# Patient Record
Sex: MALE | Race: WHITE | NOT HISPANIC OR LATINO
[De-identification: names, ages, dates, MRNs, and addresses within clinical notes are randomized per-mention and may not be internally consistent; named-entity substitution may affect disease eponyms.]

---

## 2018-08-20 PROBLEM — Z00.00 ENCOUNTER FOR PREVENTIVE HEALTH EXAMINATION: Status: ACTIVE | Noted: 2018-08-20

## 2018-09-13 ENCOUNTER — RECORD ABSTRACTING (OUTPATIENT)
Age: 63
End: 2018-09-13

## 2018-09-13 DIAGNOSIS — Z78.9 OTHER SPECIFIED HEALTH STATUS: ICD-10-CM

## 2018-09-13 DIAGNOSIS — Z87.891 PERSONAL HISTORY OF NICOTINE DEPENDENCE: ICD-10-CM

## 2018-09-13 DIAGNOSIS — Z83.3 FAMILY HISTORY OF DIABETES MELLITUS: ICD-10-CM

## 2018-09-13 LAB
HBA1C MFR BLD HPLC: 7.7
HBA1C MFR BLD: 7.7
HBA1C MFR BLD: 7.7
PODIATRY EVAL: NORMAL

## 2018-09-13 RX ORDER — ROSUVASTATIN CALCIUM 10 MG/1
10 TABLET, FILM COATED ORAL DAILY
Refills: 0 | Status: ACTIVE | COMMUNITY

## 2018-09-19 ENCOUNTER — APPOINTMENT (OUTPATIENT)
Dept: ENDOCRINOLOGY | Facility: CLINIC | Age: 63
End: 2018-09-19
Payer: COMMERCIAL

## 2018-09-19 VITALS
HEIGHT: 67 IN | HEART RATE: 82 BPM | WEIGHT: 213 LBS | DIASTOLIC BLOOD PRESSURE: 90 MMHG | SYSTOLIC BLOOD PRESSURE: 140 MMHG | BODY MASS INDEX: 33.43 KG/M2

## 2018-09-19 LAB — GLUCOSE BLDC GLUCOMTR-MCNC: 141

## 2018-09-19 PROCEDURE — 82962 GLUCOSE BLOOD TEST: CPT

## 2018-09-19 PROCEDURE — 99215 OFFICE O/P EST HI 40 MIN: CPT | Mod: 25

## 2018-09-19 RX ORDER — ENALAPRIL MALEATE 5 MG/1
5 TABLET ORAL DAILY
Refills: 0 | Status: DISCONTINUED | COMMUNITY
End: 2018-09-19

## 2018-09-20 ENCOUNTER — APPOINTMENT (OUTPATIENT)
Dept: ENDOCRINOLOGY | Facility: CLINIC | Age: 63
End: 2018-09-20
Payer: COMMERCIAL

## 2018-09-20 PROCEDURE — 76536 US EXAM OF HEAD AND NECK: CPT

## 2018-09-28 ENCOUNTER — RESULT REVIEW (OUTPATIENT)
Age: 63
End: 2018-09-28

## 2018-12-07 ENCOUNTER — MEDICATION RENEWAL (OUTPATIENT)
Age: 63
End: 2018-12-07

## 2019-01-04 ENCOUNTER — MEDICATION RENEWAL (OUTPATIENT)
Age: 64
End: 2019-01-04

## 2019-01-15 ENCOUNTER — RECORD ABSTRACTING (OUTPATIENT)
Age: 64
End: 2019-01-15

## 2019-01-15 DIAGNOSIS — Z86.39 PERSONAL HISTORY OF OTHER ENDOCRINE, NUTRITIONAL AND METABOLIC DISEASE: ICD-10-CM

## 2019-01-23 ENCOUNTER — APPOINTMENT (OUTPATIENT)
Dept: ENDOCRINOLOGY | Facility: CLINIC | Age: 64
End: 2019-01-23
Payer: MEDICARE

## 2019-01-23 VITALS
HEART RATE: 90 BPM | DIASTOLIC BLOOD PRESSURE: 90 MMHG | SYSTOLIC BLOOD PRESSURE: 142 MMHG | HEIGHT: 67 IN | WEIGHT: 215 LBS | BODY MASS INDEX: 33.74 KG/M2

## 2019-01-23 DIAGNOSIS — H26.9 UNSPECIFIED CATARACT: ICD-10-CM

## 2019-01-23 LAB
GLUCOSE BLDC GLUCOMTR-MCNC: 114
GLUCOSE SERPL-MCNC: 193
HBA1C MFR BLD HPLC: 8.2
LDLC SERPL DIRECT ASSAY-MCNC: 88
MICROALBUMIN/CREAT 24H UR-RTO: 4

## 2019-01-23 PROCEDURE — 99214 OFFICE O/P EST MOD 30 MIN: CPT | Mod: 25

## 2019-01-23 PROCEDURE — 95251 CONT GLUC MNTR ANALYSIS I&R: CPT

## 2019-01-23 RX ORDER — FLASH GLUCOSE SENSOR
KIT MISCELLANEOUS
Qty: 0 | Refills: 0 | Status: COMPLETED | OUTPATIENT
Start: 2019-01-23

## 2019-01-23 RX ADMIN — Medication 0: at 00:00

## 2019-03-04 ENCOUNTER — MEDICATION RENEWAL (OUTPATIENT)
Age: 64
End: 2019-03-04

## 2019-03-04 RX ORDER — INSULIN ASPART 100 [IU]/ML
100 INJECTION, SOLUTION INTRAVENOUS; SUBCUTANEOUS
Qty: 2 | Refills: 0 | Status: DISCONTINUED | COMMUNITY
Start: 1900-01-01 | End: 2019-03-04

## 2019-05-07 ENCOUNTER — APPOINTMENT (OUTPATIENT)
Dept: ENDOCRINOLOGY | Facility: CLINIC | Age: 64
End: 2019-05-07
Payer: MEDICARE

## 2019-05-07 VITALS
HEIGHT: 67 IN | WEIGHT: 210 LBS | HEART RATE: 90 BPM | OXYGEN SATURATION: 95 % | DIASTOLIC BLOOD PRESSURE: 80 MMHG | SYSTOLIC BLOOD PRESSURE: 140 MMHG | BODY MASS INDEX: 32.96 KG/M2

## 2019-05-07 DIAGNOSIS — Z79.899 OTHER LONG TERM (CURRENT) DRUG THERAPY: ICD-10-CM

## 2019-05-07 LAB — GLUCOSE BLDC GLUCOMTR-MCNC: 265

## 2019-05-07 PROCEDURE — 99214 OFFICE O/P EST MOD 30 MIN: CPT | Mod: 25

## 2019-05-07 PROCEDURE — 82962 GLUCOSE BLOOD TEST: CPT

## 2019-06-17 ENCOUNTER — RX RENEWAL (OUTPATIENT)
Age: 64
End: 2019-06-17

## 2019-07-29 ENCOUNTER — MEDICATION RENEWAL (OUTPATIENT)
Age: 64
End: 2019-07-29

## 2019-08-05 ENCOUNTER — APPOINTMENT (OUTPATIENT)
Dept: ENDOCRINOLOGY | Facility: CLINIC | Age: 64
End: 2019-08-05
Payer: MEDICARE

## 2019-08-05 VITALS
SYSTOLIC BLOOD PRESSURE: 134 MMHG | DIASTOLIC BLOOD PRESSURE: 80 MMHG | WEIGHT: 213 LBS | HEIGHT: 67 IN | BODY MASS INDEX: 33.43 KG/M2 | HEART RATE: 84 BPM

## 2019-08-05 DIAGNOSIS — E11.319 TYPE 2 DIABETES MELLITUS WITH UNSPECIFIED DIABETIC RETINOPATHY W/OUT MACULAR EDEMA: ICD-10-CM

## 2019-08-05 LAB
GLUCOSE BLDC GLUCOMTR-MCNC: 251
GLUCOSE SERPL-MCNC: 226
HBA1C MFR BLD HPLC: 10.1
LDLC SERPL DIRECT ASSAY-MCNC: 87
MICROALBUMIN/CREAT 24H UR-RTO: 4

## 2019-08-05 PROCEDURE — 82962 GLUCOSE BLOOD TEST: CPT

## 2019-08-05 PROCEDURE — 99214 OFFICE O/P EST MOD 30 MIN: CPT | Mod: 25

## 2019-08-29 ENCOUNTER — RX RENEWAL (OUTPATIENT)
Age: 64
End: 2019-08-29

## 2019-09-12 ENCOUNTER — RX RENEWAL (OUTPATIENT)
Age: 64
End: 2019-09-12

## 2019-10-18 ENCOUNTER — MEDICATION RENEWAL (OUTPATIENT)
Age: 64
End: 2019-10-18

## 2019-11-11 ENCOUNTER — MEDICATION RENEWAL (OUTPATIENT)
Age: 64
End: 2019-11-11

## 2019-11-25 LAB
HBA1C MFR BLD HPLC: 8.6
LDLC SERPL DIRECT ASSAY-MCNC: 74

## 2019-11-26 ENCOUNTER — APPOINTMENT (OUTPATIENT)
Dept: ENDOCRINOLOGY | Facility: CLINIC | Age: 64
End: 2019-11-26
Payer: MEDICARE

## 2019-11-26 VITALS
DIASTOLIC BLOOD PRESSURE: 86 MMHG | WEIGHT: 211 LBS | BODY MASS INDEX: 33.12 KG/M2 | HEIGHT: 67 IN | SYSTOLIC BLOOD PRESSURE: 144 MMHG | HEART RATE: 96 BPM

## 2019-11-26 LAB — GLUCOSE BLDC GLUCOMTR-MCNC: 163

## 2019-11-26 PROCEDURE — 82962 GLUCOSE BLOOD TEST: CPT

## 2019-11-26 PROCEDURE — 99214 OFFICE O/P EST MOD 30 MIN: CPT | Mod: 25

## 2019-11-26 RX ORDER — GLIPIZIDE 10 MG/1
10 TABLET, FILM COATED, EXTENDED RELEASE ORAL DAILY
Qty: 90 | Refills: 1 | Status: DISCONTINUED | COMMUNITY
Start: 2019-09-12 | End: 2019-11-26

## 2019-11-26 NOTE — REVIEW OF SYSTEMS
[Polydipsia] : polydipsia [Fatigue] : no fatigue [Recent Weight Gain (___ Lbs)] : no recent weight gain [Recent Weight Loss (___ Lbs)] : no recent weight loss [Visual Field Defect] : no visual field defect [Blurry Vision] : no blurred vision [Chest Pain] : no chest pain [Palpitations] : no palpitations [SOB on Exertion] : no shortness of breath during exertion [Shortness Of Breath] : no shortness of breath [Nausea] : no nausea [Vomiting] : no vomiting was observed [Abdominal Pain] : no abdominal pain [Polyuria] : no polyuria [Depression] : no depression [Nocturia] : no nocturia [Heat Intolerance] : heat tolerant [Cold Intolerance] : cold tolerant [Anxiety] : no anxiety [FreeTextEntry3] : s/p cataract surgery

## 2019-11-26 NOTE — HISTORY OF PRESENT ILLNESS
[FreeTextEntry1] : no recent labs, has appt tomorrow for blood draw\par \par Quality: Type 2\par Severity: severe, uncontrolled, chronically poor control\par Duration: 2011\par Onset: thirst, increased urination\par \par ASSOCIATED SYMPTOMS/COMPLICATIONS:\par 7/2016 eye exam (+) retinopathy s/p laser tx, eye exam 2018 okay per pt. eye exam 5/29/19 - noDR\par (-)neuropathy\par no h/o CAD\par neg urine microalb/Cr\par \par MODIFYING FACTORS: \par Diet: nonadherent, eats out a lot. eat BF and DInner, snack in between - fruit\par Exercise: treadmill on most days\par \par Current DM meds:\par Lantus 20 units at bedtime\par Humalog 12 units after breakfast and dinner, does not eat lunch\par Glipizide ER 10 mg daily\par Januvia 100 mg daily\par Glucophage 1000 mg BID\par \par SMBG:  glucoses okay per pt FS 's, testing 2x daily, 1x 's after very strenrous activity\par meter reviewed\par 11/15 226\par 11/16 260\par 11/17 ---------- 205 - 259\par 11/18 158 - 119 - 54\par 11/20 --------- 265 - 179\par 11/21 162  ----- \par 11/22 ------------- 76\par 11/23 -------------------- 240\par 11/24 187 --------- 161\par 11/25  ------------------- 72\par \par \par

## 2019-11-26 NOTE — PHYSICAL EXAM
[Alert] : alert [No Acute Distress] : no acute distress [Well Nourished] : well nourished [PERRL] : pupils equal, round and reactive to light [Well Developed] : well developed [No LAD] : no lymphadenopathy [EOMI] : extra ocular movement intact [Normal Rate and Effort] : normal respiratory rhythm and effort [Clear to Auscultation] : lungs were clear to auscultation bilaterally [Normal Rate] : heart rate was normal  [Normal S1, S2] : normal S1 and S2 [Normal Bowel Sounds] : normal bowel sounds [No Edema] : there was no peripheral edema [Not Tender] : non-tender [Soft] : abdomen soft [Not Distended] : not distended [Cranial Nerves Intact] : cranial nerves 2-12 were intact [Oriented x3] : oriented to person, place, and time [Normal Insight/Judgement] : insight and judgment were intact [Normal Affect] : the affect was normal [Normal Mood] : the mood was normal [Foot Ulcers] : no foot ulcers [Right Foot Was Examined] : right foot ~C was examined [Left Foot Was Examined] : left foot ~C was examined [Position Sense Dec.] : normal position sense at the level of the toes [Vibration Dec.] : normal vibratory sensation at the level of the toes [2+] : 2+ in the dorsalis pedis [de-identified] : obese appearance [Diminished Throughout Both Feet] : normal tactile sensation with monofilament testing throughout both feet

## 2019-11-26 NOTE — DATA REVIEWED
[FreeTextEntry1] : Yudelka thakkar in office 9/20/18\par The right thyroid lobe measures 4.0x1.7x1.7 cm. The left thyroid lobe measures 3.8x1.5x1.2 cm. The isthmus measures.5 cm. \par The right thyroid lobe has a homogenous parenchyma. \par The left thyroid lobe has a homogeneous parenchyma . \par There are no distinct nodules visualized. \par \par Labs 10/6/18\par Cr 0.93, GFR 86\par urine microalb/Cr 4\par LDl chol 88, Tg 176\par TSh 4.21\par A1c 8.2%\par \par Labs 1/29/19\par Gluc 180, A1c 8%\par Cr 0.86, GFR 91\par LDl 80, HDL 44, Tg 170\par \par Labs 5/16/19\par Gluc 226, A1c 10.1\par Cr 0.89, GFR 90\par urine microalb/Cr 4\par LDL 87, HDL 38, Tg 254\par TSH 3.87\par B12 266\par \par no recent labs

## 2019-11-26 NOTE — ASSESSMENT
[FreeTextEntry1] : 1. T2DM -  suboptimal control - fasting hyperglycemia, evenign hypoglycemia\par - increase Lantus 24 units, decreased Glipizide ER 5 mg daily\par - cont prandial Humalog, MFN and Januvia\par - needs to test more, send logs in 1-2 weeks\par \par 2. HLD - controlled, cont statin\par 3. HTN - BP elevated, cont current BP meds, PCP f/u, counseled pt on low salt diet\par \par Updated labs need, pt to go for blood  test tomorrow\par

## 2019-11-26 NOTE — REASON FOR VISIT
[Follow-Up: _____] : a [unfilled] follow-up visit [FreeTextEntry1] : worsening T2DM, stable hyperlipidemia, worsening HTN

## 2020-01-28 ENCOUNTER — RX RENEWAL (OUTPATIENT)
Age: 65
End: 2020-01-28

## 2020-02-20 ENCOUNTER — RX RENEWAL (OUTPATIENT)
Age: 65
End: 2020-02-20

## 2020-04-08 ENCOUNTER — APPOINTMENT (OUTPATIENT)
Dept: ENDOCRINOLOGY | Facility: CLINIC | Age: 65
End: 2020-04-08
Payer: MEDICARE

## 2020-04-08 PROCEDURE — 99442: CPT

## 2020-05-12 ENCOUNTER — RX RENEWAL (OUTPATIENT)
Age: 65
End: 2020-05-12

## 2020-05-21 ENCOUNTER — RX CHANGE (OUTPATIENT)
Age: 65
End: 2020-05-21

## 2020-05-22 ENCOUNTER — RX RENEWAL (OUTPATIENT)
Age: 65
End: 2020-05-22

## 2020-05-22 RX ORDER — BLOOD SUGAR DIAGNOSTIC
STRIP MISCELLANEOUS 4 TIMES DAILY
Qty: 4 | Refills: 1 | Status: DISCONTINUED | COMMUNITY
Start: 2020-05-21 | End: 2020-05-22

## 2020-06-22 ENCOUNTER — RX RENEWAL (OUTPATIENT)
Age: 65
End: 2020-06-22

## 2020-07-24 ENCOUNTER — RX RENEWAL (OUTPATIENT)
Age: 65
End: 2020-07-24

## 2020-07-27 LAB
HBA1C MFR BLD HPLC: 8.8
LDLC SERPL DIRECT ASSAY-MCNC: 74

## 2020-07-28 ENCOUNTER — APPOINTMENT (OUTPATIENT)
Dept: ENDOCRINOLOGY | Facility: CLINIC | Age: 65
End: 2020-07-28
Payer: MEDICARE

## 2020-07-28 VITALS
HEART RATE: 90 BPM | DIASTOLIC BLOOD PRESSURE: 88 MMHG | WEIGHT: 220 LBS | SYSTOLIC BLOOD PRESSURE: 160 MMHG | HEIGHT: 67 IN | BODY MASS INDEX: 34.53 KG/M2

## 2020-07-28 DIAGNOSIS — E11.649 TYPE 2 DIABETES MELLITUS WITH HYPOGLYCEMIA W/OUT COMA: ICD-10-CM

## 2020-07-28 LAB — GLUCOSE BLDC GLUCOMTR-MCNC: 124

## 2020-07-28 PROCEDURE — 82962 GLUCOSE BLOOD TEST: CPT

## 2020-07-28 PROCEDURE — 99214 OFFICE O/P EST MOD 30 MIN: CPT | Mod: 25

## 2020-07-28 NOTE — REVIEW OF SYSTEMS
[Recent Weight Gain (___ Lbs)] : recent weight gain: [unfilled] lbs [Constipation] : constipation [Blurred Vision] : no blurred vision [Chest Pain] : no chest pain [Palpitations] : no palpitations [Shortness Of Breath] : no shortness of breath [SOB on Exertion] : no shortness of breath on exertion [Nausea] : no nausea [Abdominal Pain] : no abdominal pain [Polyuria] : no polyuria [Nocturia] : no nocturia [Pain/Numbness of Digits] : no pain/numbness of digits [Depression] : no depression [Anxiety] : no anxiety [Polydipsia] : no polydipsia

## 2020-07-28 NOTE — PHYSICAL EXAM
[Alert] : alert [Well Nourished] : well nourished [Healthy Appearance] : healthy appearance [No Acute Distress] : no acute distress [EOMI] : extra ocular movement intact [No LAD] : no lymphadenopathy [Thyroid Not Enlarged] : the thyroid was not enlarged [No Thyroid Nodules] : no palpable thyroid nodules [No Respiratory Distress] : no respiratory distress [No Accessory Muscle Use] : no accessory muscle use [Clear to Auscultation] : lungs were clear to auscultation bilaterally [Normal S1, S2] : normal S1 and S2 [Normal Rate] : heart rate was normal [No Edema] : no peripheral edema [Normal Bowel Sounds] : normal bowel sounds [Soft] : abdomen soft [Normal Gait] : normal gait [Acanthosis Nigricans] : no acanthosis nigricans [Cranial Nerves Intact] : cranial nerves 2-12 were intact [Oriented x3] : oriented to person, place, and time [Normal Affect] : the affect was normal [Normal Insight/Judgement] : insight and judgment were intact [Normal Mood] : the mood was normal

## 2020-07-28 NOTE — DATA REVIEWED
[FreeTextEntry1] : Yudelka thakkar in office 9/20/18\par The right thyroid lobe measures 4.0x1.7x1.7 cm. The left thyroid lobe measures 3.8x1.5x1.2 cm. The isthmus measures.5 cm. \par The right thyroid lobe has a homogenous parenchyma. \par The left thyroid lobe has a homogeneous parenchyma . \par There are no distinct nodules visualized. \par \par Labs 10/6/18\par Cr 0.93, GFR 86\par urine microalb/Cr 4\par LDl chol 88, Tg 176\par TSh 4.21\par A1c 8.2%\par \par Labs 1/29/19\par Gluc 180, A1c 8%\par Cr 0.86, GFR 91\par LDl 80, HDL 44, Tg 170\par \par Labs 5/16/19\par Gluc 226, A1c 10.1\par Cr 0.89, GFR 90\par urine microalb/Cr 4\par LDL 87, HDL 38, Tg 254\par TSH 3.87\par B12 266\par \par Labs 6/26/20\par Gluc 217, A1c 8.8\par Cr 0.95, GFR 83\par LDL 74, HDL 41, Tg 193

## 2020-07-28 NOTE — HISTORY OF PRESENT ILLNESS
[FreeTextEntry1] : Interval Hx - c/o weight gain\par \par Quality: Type 2\par Severity: severe, uncontrolled, chronically poor control\par Duration: 2011\par Onset: thirst, increased urination\par \par ASSOCIATED SYMPTOMS/COMPLICATIONS:\par 7/2016 eye exam (+) retinopathy s/p laser tx, eye exam 2018 okay per pt. eye exam 5/29/19 - noDR\par (-)neuropathy\par no h/o CAD\par neg urine microalb/Cr\par \par MODIFYING FACTORS: \par Diet:  eat BF and Dinner.  BF eggs w 2 slices, dinner stew\par Exercise: 30 min brisk walk on treadmill\par \par Current DM meds:\par Lantus 30 units at bedtime\par Humalog 14 units after breakfast and dinner, does not eat lunch.. snacks on fruits\par Glipizide ER 5 mg daily\par Januvia 100 mg daily\par Glucophage 1000 mg BID\par \par SMBG:  no meters. does not record keep. testing 3x daily. denies lows\par am: \par 1-2 pm: 148\par before dinner 's\par \par \par \par

## 2020-07-28 NOTE — ASSESSMENT
[FreeTextEntry1] : 1. T2DM - suboptimal control, no logs w pt\par - decrease fruit intake or have fruits as part of carbs for meals, try and eat less carbs, more veggies and protein\par - cont Lantus 30 units,cont current oral DM meds, cont prandial insulin\par - keep diet log, test FS 1-2 hours post meals and snacks and send logs\par - repeat A1c 3 months\par - eye exam w ophthalmology\par \par 2. HLD - cont statin\par \par 3. HTN -elevated,watch dietary salt, cont current BP meds, advised PCP f/u for BP management

## 2020-08-18 ENCOUNTER — RX RENEWAL (OUTPATIENT)
Age: 65
End: 2020-08-18

## 2020-10-06 ENCOUNTER — RX RENEWAL (OUTPATIENT)
Age: 65
End: 2020-10-06

## 2020-10-20 ENCOUNTER — APPOINTMENT (OUTPATIENT)
Dept: ENDOCRINOLOGY | Facility: CLINIC | Age: 65
End: 2020-10-20
Payer: MEDICARE

## 2020-10-20 VITALS
OXYGEN SATURATION: 98 % | HEIGHT: 67 IN | HEART RATE: 88 BPM | DIASTOLIC BLOOD PRESSURE: 88 MMHG | BODY MASS INDEX: 33.74 KG/M2 | SYSTOLIC BLOOD PRESSURE: 142 MMHG | WEIGHT: 215 LBS

## 2020-10-20 LAB — GLUCOSE BLDC GLUCOMTR-MCNC: 125

## 2020-10-20 PROCEDURE — 99214 OFFICE O/P EST MOD 30 MIN: CPT | Mod: 25

## 2020-10-20 PROCEDURE — 95250 CONT GLUC MNTR PHYS/QHP EQP: CPT

## 2020-10-20 PROCEDURE — 82962 GLUCOSE BLOOD TEST: CPT

## 2020-10-20 RX ORDER — ENALAPRIL MALEATE 20 MG/1
20 TABLET ORAL
Refills: 0 | Status: ACTIVE | COMMUNITY

## 2020-10-20 RX ORDER — ENALAPRIL MALEATE 10 MG/1
10 TABLET ORAL DAILY
Qty: 90 | Refills: 0 | Status: DISCONTINUED | COMMUNITY
End: 2020-10-20

## 2020-10-20 RX ORDER — ASPIRIN 81 MG
81 TABLET,CHEWABLE ORAL
Refills: 0 | Status: ACTIVE | COMMUNITY

## 2020-10-20 RX ADMIN — Medication 0: at 00:00

## 2020-10-20 NOTE — ASSESSMENT
[FreeTextEntry1] : 1. T2DM - suboptimal control, no logs w pt, no recent labs\par - Gen Pro today to assess for patterns of highs/lows\par - cont Lantus 30 units,cont current oral DM meds, cont prandial insulin\par - keep diet log, test FS 1-2 hours post meals and snacks and send logs\par - repeat A1c needed\par -  yearly eye exam w ophthalmology\par \par 2. HLD - cont statin\par \par 3. HTN - improved cont current BP meds, f/u cardio

## 2020-10-20 NOTE — PHYSICAL EXAM
[Alert] : alert [Well Nourished] : well nourished [EOMI] : extra ocular movement intact [Healthy Appearance] : healthy appearance [No Acute Distress] : no acute distress [No LAD] : no lymphadenopathy [Thyroid Not Enlarged] : the thyroid was not enlarged [No Thyroid Nodules] : no palpable thyroid nodules [No Respiratory Distress] : no respiratory distress [No Accessory Muscle Use] : no accessory muscle use [Clear to Auscultation] : lungs were clear to auscultation bilaterally [Normal Rate] : heart rate was normal [Normal S1, S2] : normal S1 and S2 [No Edema] : no peripheral edema [Normal Bowel Sounds] : normal bowel sounds [Soft] : abdomen soft [Normal Gait] : normal gait [Oriented x3] : oriented to person, place, and time [Cranial Nerves Intact] : cranial nerves 2-12 were intact [Normal Mood] : the mood was normal [Normal Insight/Judgement] : insight and judgment were intact [Normal Affect] : the affect was normal [Acanthosis Nigricans] : no acanthosis nigricans

## 2020-10-20 NOTE — HISTORY OF PRESENT ILLNESS
[FreeTextEntry1] : Interval Hx - 8/2020 went to [facility] for chest pain, saw cardio and BP meds changed\par \par Quality: Type 2\par Severity: severe, uncontrolled, chronically poor control\par Duration: 2011\par Onset: thirst, increased urination\par \par ASSOCIATED SYMPTOMS/COMPLICATIONS:\par 8/2020 eye exam (+) retinopathy s/p laser tx, eye exam 2018 okay per pt.\par (-)neuropathy\par \par MODIFYING FACTORS: \par Diet:  trying to watch diet. eats 2 meals w snacks on fruits/nuts\par Exercise: 30 min brisk walk on treadmill\par Weight stable. \par \par Current DM meds:\par Lantus 30 units at bedtime\par Humalog 14 units after breakfast and dinner, does not eat lunch. 4-5  units snacks on fruits\par Glipizide ER 5 mg daily\par Januvia 100 mg daily\par Glucophage 1000 mg BID\par \par SMBG:  no meter. does not record keep. testing 3x daily. FS 's\par \par \par \par \par

## 2020-10-20 NOTE — REVIEW OF SYSTEMS
[Recent Weight Gain (___ Lbs)] : recent weight gain: [unfilled] lbs [Blurred Vision] : no blurred vision [Chest Pain] : no chest pain [Palpitations] : no palpitations [SOB on Exertion] : no shortness of breath on exertion [Shortness Of Breath] : no shortness of breath [Abdominal Pain] : no abdominal pain [Nausea] : no nausea [Polyuria] : no polyuria [Nocturia] : no nocturia [Acanthosis] : no acanthosis  [Pain/Numbness of Digits] : no pain/numbness of digits [Depression] : no depression [Anxiety] : no anxiety [Polydipsia] : no polydipsia

## 2020-10-29 ENCOUNTER — NON-APPOINTMENT (OUTPATIENT)
Age: 65
End: 2020-10-29

## 2020-11-10 ENCOUNTER — NON-APPOINTMENT (OUTPATIENT)
Age: 65
End: 2020-11-10

## 2021-01-12 ENCOUNTER — RX RENEWAL (OUTPATIENT)
Age: 66
End: 2021-01-12

## 2021-01-19 LAB
HBA1C MFR BLD HPLC: 7.9
LDLC SERPL DIRECT ASSAY-MCNC: 56

## 2021-01-20 ENCOUNTER — APPOINTMENT (OUTPATIENT)
Dept: ENDOCRINOLOGY | Facility: CLINIC | Age: 66
End: 2021-01-20
Payer: MEDICARE

## 2021-01-20 VITALS
OXYGEN SATURATION: 97 % | WEIGHT: 215 LBS | BODY MASS INDEX: 33.74 KG/M2 | DIASTOLIC BLOOD PRESSURE: 90 MMHG | HEART RATE: 79 BPM | HEIGHT: 67 IN | SYSTOLIC BLOOD PRESSURE: 210 MMHG

## 2021-01-20 LAB — GLUCOSE BLDC GLUCOMTR-MCNC: 122

## 2021-01-20 PROCEDURE — 99214 OFFICE O/P EST MOD 30 MIN: CPT | Mod: 25

## 2021-01-20 PROCEDURE — 82962 GLUCOSE BLOOD TEST: CPT

## 2021-01-20 NOTE — DATA REVIEWED
[FreeTextEntry1] : Yudelka thakkar in office 9/20/18\par The right thyroid lobe measures 4.0x1.7x1.7 cm. The left thyroid lobe measures 3.8x1.5x1.2 cm. The isthmus measures.5 cm. \par The right thyroid lobe has a homogenous parenchyma. \par The left thyroid lobe has a homogeneous parenchyma . \par There are no distinct nodules visualized. \par ===========================================\par Labs 10/6/18\par Cr 0.93, GFR 86\par urine microalb/Cr 4\par LDl chol 88, Tg 176\par TSh 4.21\par A1c 8.2%\par \par Labs 1/29/19\par Gluc 180, A1c 8%\par Cr 0.86, GFR 91\par LDl 80, HDL 44, Tg 170\par \par Labs 5/16/19\par Gluc 226, A1c 10.1\par Cr 0.89, GFR 90\par urine microalb/Cr 4\par LDL 87, HDL 38, Tg 254\par TSH 3.87\par B12 266\par \par Labs 6/26/20\par Gluc 217, A1c 8.8\par Cr 0.95, GFR 83\par LDL 74, HDL 41, Tg 193\par \par Labs 10/27/20 reviewed\par A1c 7.9, improved but still elevated\par neg urine microalb/Cr\par LDL chol 56\par normal TSH and B12 level

## 2021-01-20 NOTE — ASSESSMENT
[FreeTextEntry1] : 1. T2DM - suboptimal control, no recent A1c, reports hypoglycemia after exercise\par - repeat A1c this month\par - cont Lantus 30 units,cont current oral DM meds\par - increase Humalog 14 w BF and 16 units w dinner\par - keep diet log, test FS 1-2 hours post meals and snacks and send logs\par -  yearly eye exam w ophthalmology\par - change Januvia timing to am\par - take light snack w exercise, try low sugar gatorade during exercise\par \par 2. HLD - cont statin, updated lipid panel\par \par 3. HTN - significantly elevated today and confirmed on repeat measure.  he is asymptomatic. risks of high BP discussed, ER eval if sx develop.  needs to watch salt in diet, increase PO water. cardio follow up soon to adjust BP meds

## 2021-01-20 NOTE — HISTORY OF PRESENT ILLNESS
[FreeTextEntry1] : Interval Hx - BP high today, denies headache/CP/back pains/blurry vision, has been adherent w BP meds\par \par Quality: Type 2\par Severity: severe, uncontrolled, chronically poor control\par Duration: 2011\par Onset: thirst, increased urination\par \par ASSOCIATED SYMPTOMS/COMPLICATIONS:\par 8/2020 eye exam (+) retinopathy s/p laser tx\par (-)neuropathy\par \par MODIFYING FACTORS: worse w diet over holidays\par Exercise: 30 min brisk walk on treadmill\par Weight stable. \par \par Current DM meds:\par Lantus 30 units at bedtime\par Humalog 14 units before breakfast and dinner, does not eat lunch. 4-5 units snacks on fruits\par Glipizide ER 5 mg daily\par Januvia 100 mg night\par Glucophage 1000 mg BID\par \par SMBG:  does not record keep. testing 3x daily. FS this am 66 - 7 am, used treadmill last night at 2:30 am, low sugar after exercise\par 1/15 153 - 183 -219\par 1/16 98 - 154 - 187 - 98\par 1/17 220--------- 147 - 182\par 1/18 165--------- 129 - 182\par 1/19 100 --------- 194 - 214\par 1/20 61\par \par \par \par \par

## 2021-01-20 NOTE — REVIEW OF SYSTEMS
[Recent Weight Gain (___ Lbs)] : no recent weight gain [Blurred Vision] : no blurred vision [Chest Pain] : no chest pain [Shortness Of Breath] : no shortness of breath [SOB on Exertion] : no shortness of breath on exertion [Nausea] : no nausea [Abdominal Pain] : no abdominal pain [Polyuria] : no polyuria [Nocturia] : no nocturia [Acanthosis] : no acanthosis  [Pain/Numbness of Digits] : no pain/numbness of digits [Depression] : no depression [Anxiety] : no anxiety [Polydipsia] : no polydipsia

## 2021-01-20 NOTE — PHYSICAL EXAM
[Alert] : alert [Well Nourished] : well nourished [Healthy Appearance] : healthy appearance [No Acute Distress] : no acute distress [EOMI] : extra ocular movement intact [No LAD] : no lymphadenopathy [Thyroid Not Enlarged] : the thyroid was not enlarged [No Thyroid Nodules] : no palpable thyroid nodules [No Respiratory Distress] : no respiratory distress [No Accessory Muscle Use] : no accessory muscle use [Clear to Auscultation] : lungs were clear to auscultation bilaterally [Normal S1, S2] : normal S1 and S2 [Normal Rate] : heart rate was normal [No Edema] : no peripheral edema [Normal Bowel Sounds] : normal bowel sounds [Soft] : abdomen soft [Normal Gait] : normal gait [Cranial Nerves Intact] : cranial nerves 2-12 were intact [Oriented x3] : oriented to person, place, and time [Normal Affect] : the affect was normal [Normal Insight/Judgement] : insight and judgment were intact [Normal Mood] : the mood was normal [Acanthosis Nigricans] : no acanthosis nigricans [Foot Ulcers] : no foot ulcers [2+] : 2+ in the dorsalis pedis [Vibration Dec.] : normal vibratory sensation at the level of the toes [Position Sense Dec.] : normal position sense at the level of the toes [Diminished Throughout Both Feet] : normal tactile sensation with monofilament testing throughout both feet

## 2021-02-16 ENCOUNTER — RX RENEWAL (OUTPATIENT)
Age: 66
End: 2021-02-16

## 2021-02-26 ENCOUNTER — NON-APPOINTMENT (OUTPATIENT)
Age: 66
End: 2021-02-26

## 2021-03-01 ENCOUNTER — RX RENEWAL (OUTPATIENT)
Age: 66
End: 2021-03-01

## 2021-05-10 LAB
HBA1C MFR BLD HPLC: 8.4
LDLC SERPL DIRECT ASSAY-MCNC: 69
MICROALBUMIN/CREAT 24H UR-RTO: 6

## 2021-05-11 ENCOUNTER — APPOINTMENT (OUTPATIENT)
Dept: ENDOCRINOLOGY | Facility: CLINIC | Age: 66
End: 2021-05-11
Payer: MEDICARE

## 2021-05-11 VITALS
BODY MASS INDEX: 35.47 KG/M2 | HEIGHT: 67 IN | WEIGHT: 226 LBS | DIASTOLIC BLOOD PRESSURE: 88 MMHG | HEART RATE: 69 BPM | OXYGEN SATURATION: 96 % | SYSTOLIC BLOOD PRESSURE: 158 MMHG

## 2021-05-11 DIAGNOSIS — U07.1 COVID-19: ICD-10-CM

## 2021-05-11 LAB — GLUCOSE BLDC GLUCOMTR-MCNC: 133

## 2021-05-11 PROCEDURE — 99214 OFFICE O/P EST MOD 30 MIN: CPT | Mod: 25

## 2021-05-11 PROCEDURE — 82962 GLUCOSE BLOOD TEST: CPT

## 2021-05-11 RX ORDER — CARVEDILOL 12.5 MG/1
12.5 TABLET, FILM COATED ORAL
Refills: 0 | Status: ACTIVE | COMMUNITY

## 2021-05-11 NOTE — REVIEW OF SYSTEMS
[Recent Weight Gain (___ Lbs)] : recent weight gain: [unfilled] lbs [Blurred Vision] : blurred vision [Chest Pain] : no chest pain [Shortness Of Breath] : no shortness of breath [SOB on Exertion] : no shortness of breath on exertion [Nausea] : no nausea [Abdominal Pain] : no abdominal pain [Polyuria] : no polyuria [Nocturia] : no nocturia [Acanthosis] : no acanthosis  [Pain/Numbness of Digits] : no pain/numbness of digits [Depression] : no depression [Anxiety] : no anxiety [Polydipsia] : no polydipsia

## 2021-05-11 NOTE — PHYSICAL EXAM
[Alert] : alert [Well Nourished] : well nourished [No Acute Distress] : no acute distress [EOMI] : extra ocular movement intact [No LAD] : no lymphadenopathy [No Thyroid Nodules] : no palpable thyroid nodules [No Respiratory Distress] : no respiratory distress [No Accessory Muscle Use] : no accessory muscle use [Clear to Auscultation] : lungs were clear to auscultation bilaterally [Normal S1, S2] : normal S1 and S2 [Normal Rate] : heart rate was normal [No Edema] : no peripheral edema [Normal Bowel Sounds] : normal bowel sounds [Soft] : abdomen soft [Normal Gait] : normal gait [2+] : 2+ in the dorsalis pedis [Cranial Nerves Intact] : cranial nerves 2-12 were intact [Oriented x3] : oriented to person, place, and time [Normal Affect] : the affect was normal [Normal Insight/Judgement] : insight and judgment were intact [Normal Mood] : the mood was normal [Obese] : obese [Acanthosis Nigricans] : no acanthosis nigricans [Foot Ulcers] : no foot ulcers [Vibration Dec.] : normal vibratory sensation at the level of the toes [Position Sense Dec.] : normal position sense at the level of the toes [Diminished Throughout Both Feet] : normal tactile sensation with monofilament testing throughout both feet

## 2021-05-11 NOTE — HISTORY OF PRESENT ILLNESS
[FreeTextEntry1] : Interval Hx - s/p COVID19 infx 2/2021, (+) weight gain \par \par Quality: Type 2\par Severity: severe, uncontrolled, chronically poor control\par Duration: 2011\par Onset: thirst, increased urination\par \par ASSOCIATED SYMPTOMS/COMPLICATIONS:\par 8/2020 eye exam (+) retinopathy s/p laser tx\par (-)neuropathy\par 2021 neg urine microalb/Cr\par \par MODIFYING FACTORS:  worsening diet during pandemic\par \par Current DM meds:\par Lantus 30 units at bedtime\par Humalog 14 units before breakfast and 16 units dinner, does not eat lunch. snack on fruit without insulin\par Glipizide ER 5 mg daily\par Januvia 100 mg night\par Glucophage 1000 mg BID\par \par SMBG:  \par testing 2-3x daily, logs reviewed. FS variable . Low sugars after salad.\par \par \par \par \par

## 2021-05-11 NOTE — ASSESSMENT
[FreeTextEntry1] : 1. T2DM - suboptimal control, , hypoglycemia due to salad\par - repeat A1c this month\par - inrese Lantus 32 units,cont MFN and Glipizide\par - cont Humalog 14 w BF and 16 units w dinner, take 4 units w fru, take 6 units before salad not 16 units\par - needs to eta low carb diet\par - test 4x daily, cont record keeping\par -  yearly eye exam w ophthalmology\par - stop januvia, try trulicity 0.75 mg weekly - risks/benefits discussed, call office if help needed with injection\par \par 2. HLD - cont statin, updated lipid panel\par \par 3. HTN -cont carvedilol\par \par 4. Obesity - counseled pt on lifestyle changes, try trulicity

## 2021-05-11 NOTE — DATA REVIEWED
[FreeTextEntry1] : Yudelka thakkar in office 9/20/18\par The right thyroid lobe measures 4.0x1.7x1.7 cm. The left thyroid lobe measures 3.8x1.5x1.2 cm. The isthmus measures.5 cm. \par The right thyroid lobe has a homogenous parenchyma. \par The left thyroid lobe has a homogeneous parenchyma . \par There are no distinct nodules visualized. \par ===========================================\par Labs 10/6/18\par Cr 0.93, GFR 86\par urine microalb/Cr 4\par LDl chol 88, Tg 176\par TSh 4.21\par A1c 8.2%\par \par Labs 1/29/19\par Gluc 180, A1c 8%\par Cr 0.86, GFR 91\par LDl 80, HDL 44, Tg 170\par \par Labs 5/16/19\par Gluc 226, A1c 10.1\par Cr 0.89, GFR 90\par urine microalb/Cr 4\par LDL 87, HDL 38, Tg 254\par TSH 3.87\par B12 266\par \par Labs 6/26/20\par Gluc 217, A1c 8.8\par Cr 0.95, GFR 83\par LDL 74, HDL 41, Tg 193\par \par Labs 10/27/20 reviewed\par A1c 7.9, improved but still elevated\par neg urine microalb/Cr\par LDL chol 56\par normal TSH and B12 level\par \par Labs 2/16/21\par urine alb/Cr neg\par LDL 69 - cont statin\par A1c 8.4 - worsening, test pre/post meals and send logs\par normal TSH level\par

## 2021-05-18 ENCOUNTER — RX RENEWAL (OUTPATIENT)
Age: 66
End: 2021-05-18

## 2021-05-28 ENCOUNTER — NON-APPOINTMENT (OUTPATIENT)
Age: 66
End: 2021-05-28

## 2021-08-11 ENCOUNTER — APPOINTMENT (OUTPATIENT)
Dept: ENDOCRINOLOGY | Facility: CLINIC | Age: 66
End: 2021-08-11
Payer: MEDICARE

## 2021-08-11 VITALS
SYSTOLIC BLOOD PRESSURE: 122 MMHG | OXYGEN SATURATION: 98 % | HEIGHT: 67 IN | DIASTOLIC BLOOD PRESSURE: 74 MMHG | BODY MASS INDEX: 34.53 KG/M2 | WEIGHT: 220 LBS | HEART RATE: 84 BPM

## 2021-08-11 LAB — GLUCOSE BLDC GLUCOMTR-MCNC: 117

## 2021-08-11 PROCEDURE — 36415 COLL VENOUS BLD VENIPUNCTURE: CPT

## 2021-08-11 PROCEDURE — 82962 GLUCOSE BLOOD TEST: CPT

## 2021-08-11 PROCEDURE — 99214 OFFICE O/P EST MOD 30 MIN: CPT | Mod: 25

## 2021-08-11 RX ORDER — SITAGLIPTIN 100 MG/1
100 TABLET, FILM COATED ORAL DAILY
Qty: 90 | Refills: 1 | Status: DISCONTINUED | COMMUNITY
Start: 2020-02-20 | End: 2021-08-11

## 2021-08-11 NOTE — DATA REVIEWED
[FreeTextEntry1] : Yudelka thakkar in office 9/20/18\par The right thyroid lobe measures 4.0x1.7x1.7 cm. The left thyroid lobe measures 3.8x1.5x1.2 cm. The isthmus measures.5 cm. \par The right thyroid lobe has a homogenous parenchyma. \par The left thyroid lobe has a homogeneous parenchyma . \par There are no distinct nodules visualized. \par ===========================================\par Labs 10/6/18\par Cr 0.93, GFR 86\par urine microalb/Cr 4\par LDl chol 88, Tg 176\par TSh 4.21\par A1c 8.2%\par \par Labs 1/29/19\par Gluc 180, A1c 8%\par Cr 0.86, GFR 91\par LDl 80, HDL 44, Tg 170\par \par Labs 5/16/19\par Gluc 226, A1c 10.1\par Cr 0.89, GFR 90\par urine microalb/Cr 4\par LDL 87, HDL 38, Tg 254\par TSH 3.87\par B12 266\par \par Labs 6/26/20\par Gluc 217, A1c 8.8\par Cr 0.95, GFR 83\par LDL 74, HDL 41, Tg 193\par \par Labs 10/27/20 reviewed\par A1c 7.9, improved but still elevated\par neg urine microalb/Cr\par LDL chol 56\par normal TSH and B12 level\par \par Labs 2/16/21\par urine alb/Cr neg\par LDL 69 - cont statin\par A1c 8.4 - worsening, test pre/post meals and send logs\par normal TSH level\par \par Labs 5/18/21\par Tg 150, LDL 49\par Gluc 98, A1c 8%\par Cr 0.87, GFR 89

## 2021-08-11 NOTE — ASSESSMENT
[FreeTextEntry1] : 1. T2DM - FS well controlled on meter with hypoglycemia\par - repeat A1c today\par - cont Trulicity 0.75 mg weekly\par - decrease Lantus to 24 units\par - cont MFN and Glipizide\par - cont Humalog 14 w BF and 16 units w dinner\par - cont diet and exercise\par - test 4x daily, cont record keeping, send logs in 1 month, try and decrease insulin doses and uptitrate trulicity\par -  yearly eye exam w ophthalmology\par \par 2. HLD - cont statin, updated lipid panel tpday\par \par 3. HTN -cont carvedilol\par \par 4. Obesity - (+) weight loss,  counseled pt on lifestyle changes, cont trulicity

## 2021-08-11 NOTE — PHYSICAL EXAM
[Alert] : alert [Well Nourished] : well nourished [Obese] : obese [No Acute Distress] : no acute distress [EOMI] : extra ocular movement intact [No LAD] : no lymphadenopathy [No Thyroid Nodules] : no palpable thyroid nodules [No Respiratory Distress] : no respiratory distress [No Accessory Muscle Use] : no accessory muscle use [Clear to Auscultation] : lungs were clear to auscultation bilaterally [Normal S1, S2] : normal S1 and S2 [Normal Rate] : heart rate was normal [No Edema] : no peripheral edema [Normal Bowel Sounds] : normal bowel sounds [Soft] : abdomen soft [Normal Gait] : normal gait [Cranial Nerves Intact] : cranial nerves 2-12 were intact [Oriented x3] : oriented to person, place, and time [Normal Affect] : the affect was normal [Normal Insight/Judgement] : insight and judgment were intact [Normal Mood] : the mood was normal [Acanthosis Nigricans] : no acanthosis nigricans

## 2021-08-11 NOTE — HISTORY OF PRESENT ILLNESS
[FreeTextEntry1] : Interval Hx - reports hypoglycemia since last visit\par \par Quality: Type 2\par Severity: severe, uncontrolled, chronically poor control\par Duration: \par Onset: thirst, increased urination\par \par ASSOCIATED SYMPTOMS/COMPLICATIONS:\par 2020 eye exam (+) retinopathy s/p laser tx\par (-)neuropathy\par  neg urine microalb/Cr\par \par MODIFYING FACTORS:   improved control with diet and increased physical activity\par \par Current DM meds:\par Lantus 32 units at bedtime\par Humalog 14 units before breakfast and 16 units dinner, does not eat lunch. snack on fruit without insulin\par Glipizide ER 5 mg daily\par Trulicity 0.75 mg weekly\par Glucophage 1000 mg BID\par \par SMBx hypoglycemia FS 50 and FS 47 - w symptoms, overnight\par testing 2-3x daily, meter reviewed. -140's\par \par \par \par \par

## 2021-08-11 NOTE — REVIEW OF SYSTEMS
[Recent Weight Loss (___ Lbs)] : recent weight loss: [unfilled] lbs [Blurred Vision] : no blurred vision [Chest Pain] : no chest pain [Shortness Of Breath] : no shortness of breath [SOB on Exertion] : no shortness of breath on exertion [Nausea] : no nausea [Abdominal Pain] : no abdominal pain [Polyuria] : no polyuria [Nocturia] : no nocturia [Acanthosis] : no acanthosis  [Pain/Numbness of Digits] : no pain/numbness of digits [Depression] : no depression [Anxiety] : no anxiety [Polydipsia] : no polydipsia

## 2021-08-14 LAB
ALBUMIN SERPL ELPH-MCNC: 4.3 G/DL
ALP BLD-CCNC: 95 U/L
ALT SERPL-CCNC: 13 U/L
ANION GAP SERPL CALC-SCNC: 11 MMOL/L
AST SERPL-CCNC: 19 U/L
BILIRUB SERPL-MCNC: 0.4 MG/DL
BUN SERPL-MCNC: 20 MG/DL
CALCIUM SERPL-MCNC: 9.4 MG/DL
CHLORIDE SERPL-SCNC: 105 MMOL/L
CHOLEST SERPL-MCNC: 108 MG/DL
CO2 SERPL-SCNC: 27 MMOL/L
CREAT SERPL-MCNC: 0.91 MG/DL
ESTIMATED AVERAGE GLUCOSE: 186 MG/DL
GLUCOSE SERPL-MCNC: 124 MG/DL
HBA1C MFR BLD HPLC: 8.1 %
HDLC SERPL-MCNC: 41 MG/DL
LDLC SERPL CALC-MCNC: 46 MG/DL
NONHDLC SERPL-MCNC: 67 MG/DL
POTASSIUM SERPL-SCNC: 4.6 MMOL/L
PROT SERPL-MCNC: 6.8 G/DL
SODIUM SERPL-SCNC: 144 MMOL/L
TRIGL SERPL-MCNC: 103 MG/DL

## 2021-08-16 ENCOUNTER — NON-APPOINTMENT (OUTPATIENT)
Age: 66
End: 2021-08-16

## 2021-08-17 ENCOUNTER — RX RENEWAL (OUTPATIENT)
Age: 66
End: 2021-08-17

## 2021-10-12 ENCOUNTER — NON-APPOINTMENT (OUTPATIENT)
Age: 66
End: 2021-10-12

## 2021-10-27 ENCOUNTER — RX RENEWAL (OUTPATIENT)
Age: 66
End: 2021-10-27

## 2021-12-07 ENCOUNTER — RX RENEWAL (OUTPATIENT)
Age: 66
End: 2021-12-07

## 2022-01-26 ENCOUNTER — APPOINTMENT (OUTPATIENT)
Dept: ENDOCRINOLOGY | Facility: CLINIC | Age: 67
End: 2022-01-26
Payer: MEDICARE

## 2022-01-26 VITALS
OXYGEN SATURATION: 95 % | SYSTOLIC BLOOD PRESSURE: 145 MMHG | HEART RATE: 90 BPM | WEIGHT: 227 LBS | HEIGHT: 67 IN | BODY MASS INDEX: 35.63 KG/M2 | DIASTOLIC BLOOD PRESSURE: 90 MMHG

## 2022-01-26 LAB
GLUCOSE BLDC GLUCOMTR-MCNC: 192
HBA1C MFR BLD HPLC: 9.4
LDLC SERPL DIRECT ASSAY-MCNC: 61

## 2022-01-26 PROCEDURE — 99214 OFFICE O/P EST MOD 30 MIN: CPT | Mod: 25

## 2022-01-26 PROCEDURE — 82962 GLUCOSE BLOOD TEST: CPT

## 2022-01-26 RX ORDER — BLOOD SUGAR DIAGNOSTIC
STRIP MISCELLANEOUS
Qty: 4 | Refills: 3 | Status: ACTIVE | COMMUNITY
Start: 2019-06-17 | End: 1900-01-01

## 2022-01-26 NOTE — HISTORY OF PRESENT ILLNESS
[FreeTextEntry1] : Interval Hx - no issues, no changes\par \par Quality: Type 2\par Severity: severe, uncontrolled, chronically poor control\par Duration: 2011\par Onset: thirst, increased urination\par \par ASSOCIATED SYMPTOMS/COMPLICATIONS:\par 8/2020 eye exam (+) retinopathy s/p laser tx\par (-)neuropathy\par 2021 neg urine microalb/Cr\par \par MODIFYING FACTORS:  worsening control due to diet\par \par Current DM meds:\par Lantus 30 units at bedtime\par Humalog 14 units before breakfast and 16 units dinner, does not eat lunch. snack on fruit without insulin\par Glipizide ER 5 mg daily\par Trulicity 0.75 mg weekly\par Glucophage 1000 mg BID\par \par SMBG:  3x daily logs reviewed, variable numbers  (but testing after meals)\par \par \par \par \par

## 2022-01-26 NOTE — DATA REVIEWED
[FreeTextEntry1] : Yudelka thakkar in office 9/20/18\par The right thyroid lobe measures 4.0x1.7x1.7 cm. The left thyroid lobe measures 3.8x1.5x1.2 cm. The isthmus measures.5 cm. \par The right thyroid lobe has a homogenous parenchyma. \par The left thyroid lobe has a homogeneous parenchyma . \par There are no distinct nodules visualized. \par ===========================================\par Labs 10/6/18\par Cr 0.93, GFR 86\par urine microalb/Cr 4\par LDl chol 88, Tg 176\par TSh 4.21\par A1c 8.2%\par \par Labs 1/29/19\par Gluc 180, A1c 8%\par Cr 0.86, GFR 91\par LDl 80, HDL 44, Tg 170\par \par Labs 5/16/19\par Gluc 226, A1c 10.1\par Cr 0.89, GFR 90\par urine microalb/Cr 4\par LDL 87, HDL 38, Tg 254\par TSH 3.87\par B12 266\par \par Labs 6/26/20\par Gluc 217, A1c 8.8\par Cr 0.95, GFR 83\par LDL 74, HDL 41, Tg 193\par \par Labs 10/27/20 reviewed\par A1c 7.9, improved but still elevated\par neg urine microalb/Cr\par LDL chol 56\par normal TSH and B12 level\par \par Labs 2/16/21\par urine alb/Cr neg\par LDL 69 - cont statin\par A1c 8.4 - worsening, test pre/post meals and send logs\par normal TSH level\par \par Labs 5/18/21\par Tg 150, LDL 49\par Gluc 98, A1c 8%\par Cr 0.87, GFR 89\par \par Labs 12/27/21\par Tg 203, LDL 61, HDL 38\par Gluc 114, A1c 9.4\par Cr 0.82, GFR 92

## 2022-01-26 NOTE — REVIEW OF SYSTEMS
[Recent Weight Gain (___ Lbs)] : recent weight gain: [unfilled] lbs [Blurred Vision] : no blurred vision [Chest Pain] : no chest pain [Shortness Of Breath] : no shortness of breath [Wheezing] : no wheezing [Nausea] : no nausea [Abdominal Pain] : no abdominal pain [Polyuria] : polyuria [Nocturia] : nocturia [Pain/Numbness of Digits] : no pain/numbness of digits [Polydipsia] : no polydipsia

## 2022-01-26 NOTE — ASSESSMENT
[FreeTextEntry1] : 1. T2DM - worsening and poor control\par - stop glipizide, do not think it is helping\par - increase trulicity 1.5 mg weekly\par - increase humalog 18 w BF, add 4 with fruit, increase 20 with dinner\par - cont lantus and MFN\par - needs to adhere with diabetic diet\par - counseled pt on benefits of CGM, but he declined\par - test minumin 4x dialy amd record numbers on logs sheets\par - RTO 4 weeks w CDE\par -  yearly eye exam w ophthalmology\par \par 2. HLD - cont statin\par \par 3. HTN -cont carvedilol, watch dietary salt intake\par \par 4. Obesity - (+) weight loss,  counseled pt on lifestyle changes, cont trulicity [Long Term Vascular Complications] : long term vascular complications of diabetes

## 2022-01-26 NOTE — PHYSICAL EXAM
[Obese] : obese [No Accessory Muscle Use] : no accessory muscle use [Clear to Auscultation] : lungs were clear to auscultation bilaterally [Normal S1, S2] : normal S1 and S2 [Normal Rate] : heart rate was normal [No Edema] : no peripheral edema [No Tremors] : no tremors [Oriented x3] : oriented to person, place, and time [Normal Affect] : the affect was normal [Normal Insight/Judgement] : insight and judgment were intact [Normal Mood] : the mood was normal

## 2022-02-09 ENCOUNTER — RX RENEWAL (OUTPATIENT)
Age: 67
End: 2022-02-09

## 2022-02-23 ENCOUNTER — APPOINTMENT (OUTPATIENT)
Dept: ENDOCRINOLOGY | Facility: CLINIC | Age: 67
End: 2022-02-23
Payer: MEDICARE

## 2022-02-23 PROCEDURE — 97803 MED NUTRITION INDIV SUBSEQ: CPT

## 2022-04-12 ENCOUNTER — RX RENEWAL (OUTPATIENT)
Age: 67
End: 2022-04-12

## 2022-05-02 ENCOUNTER — RX RENEWAL (OUTPATIENT)
Age: 67
End: 2022-05-02

## 2022-05-10 ENCOUNTER — APPOINTMENT (OUTPATIENT)
Dept: ENDOCRINOLOGY | Facility: CLINIC | Age: 67
End: 2022-05-10
Payer: MEDICARE

## 2022-05-10 VITALS
HEART RATE: 82 BPM | DIASTOLIC BLOOD PRESSURE: 84 MMHG | BODY MASS INDEX: 34.84 KG/M2 | HEIGHT: 67 IN | SYSTOLIC BLOOD PRESSURE: 150 MMHG | WEIGHT: 222 LBS

## 2022-05-10 LAB — GLUCOSE BLDC GLUCOMTR-MCNC: 61

## 2022-05-10 PROCEDURE — 82962 GLUCOSE BLOOD TEST: CPT

## 2022-05-10 PROCEDURE — 99215 OFFICE O/P EST HI 40 MIN: CPT | Mod: 25

## 2022-05-10 RX ORDER — DULAGLUTIDE 0.75 MG/.5ML
0.75 INJECTION, SOLUTION SUBCUTANEOUS
Qty: 3 | Refills: 0 | Status: DISCONTINUED | COMMUNITY
Start: 2022-05-02 | End: 2022-05-10

## 2022-05-11 LAB
GLUCOSE BLDC GLUCOMTR-MCNC: 69
GLUCOSE BLDC GLUCOMTR-MCNC: 93

## 2022-05-11 NOTE — REVIEW OF SYSTEMS
[Recent Weight Loss (___ Lbs)] : recent weight loss: [unfilled] lbs [Blurred Vision] : no blurred vision [Chest Pain] : no chest pain [Shortness Of Breath] : no shortness of breath [Nausea] : no nausea [Abdominal Pain] : no abdominal pain [Polyuria] : no polyuria [Nocturia] : no nocturia [Pain/Numbness of Digits] : no pain/numbness of digits [Polydipsia] : no polydipsia

## 2022-05-11 NOTE — ASSESSMENT
[FreeTextEntry1] : 1. T2DM complicated by retinopathy - improved control with increased hypoglycemia, FS 60 today, repeat 69 then 90's\par - pt given juive and cookies and remained in office until glucoses improved\par - risks of hypoglycemia discussed, including hypoglycemic unawareness\par - stop glipizide likely causing increased lows \par - cont trulicity 1.5 mg weekly, can go up to 3 mg weekly if needed\par - cont humalog 18 w BF and 20 with dinner\par - cont lantus and MFN\par - counseled pt on benefits of CGM, but he declined\par - cont SMBG\par -  yearly eye exam w ophthalmology\par \par 2. HLD - cont statin\par \par 3. HTN - BP elevated, cont carvedilol and enalapril, watch dietary salt intake, strongly advised PCP f/u\par \par 4. Obesity - (+) weight loss,  counseled pt on lifestyle changes, cont trulicity

## 2022-05-11 NOTE — HISTORY OF PRESENT ILLNESS
[FreeTextEntry1] : Interval Hx - \par FS 61 today - skipped lunch\par had labs done this am\par \par Quality: Type 2\par Severity: severe, uncontrolled, chronically poor control\par Duration: 2011\par Onset: thirst, increased urination\par \par ASSOCIATED SYMPTOMS/COMPLICATIONS:\par 8/2020 eye exam (+) retinopathy s/p laser tx\par (-)neuropathy\par 2021 neg urine microalb/Cr\par \par MODIFYING FACTORS:  trying to watch diet, eats out 1x/week - heavy italian meal\par \par Current DM meds:\par Lantus 30 units at bedtime\par Humalog 18 units before breakfast and 20 units before dinner, does not eat lunch. snack on fruit without insulin\par Glipizide ER 5 mg daily\par Trulicity 1.5 mg weekly\par Glucophage 1000 mg BID\par \par SMBG:  testing 3x daily, variable numbers few FS 60's with symptoms, some FS post meals 200's, mostly improved -160's\par \par \par \par \par

## 2022-05-12 ENCOUNTER — NON-APPOINTMENT (OUTPATIENT)
Age: 67
End: 2022-05-12

## 2022-06-30 ENCOUNTER — RX RENEWAL (OUTPATIENT)
Age: 67
End: 2022-06-30

## 2022-07-29 ENCOUNTER — RX RENEWAL (OUTPATIENT)
Age: 67
End: 2022-07-29

## 2022-08-10 ENCOUNTER — RESULT CHARGE (OUTPATIENT)
Age: 67
End: 2022-08-10

## 2022-08-10 ENCOUNTER — APPOINTMENT (OUTPATIENT)
Dept: ENDOCRINOLOGY | Facility: CLINIC | Age: 67
End: 2022-08-10

## 2022-08-10 VITALS
BODY MASS INDEX: 35 KG/M2 | DIASTOLIC BLOOD PRESSURE: 86 MMHG | WEIGHT: 223 LBS | HEART RATE: 80 BPM | HEIGHT: 67 IN | SYSTOLIC BLOOD PRESSURE: 146 MMHG

## 2022-08-10 LAB
GLUCOSE BLDC GLUCOMTR-MCNC: 147
HBA1C MFR BLD HPLC: 8.1
LDLC SERPL DIRECT ASSAY-MCNC: 51

## 2022-08-10 PROCEDURE — 82962 GLUCOSE BLOOD TEST: CPT

## 2022-08-10 PROCEDURE — 99214 OFFICE O/P EST MOD 30 MIN: CPT | Mod: 25

## 2022-08-10 NOTE — HISTORY OF PRESENT ILLNESS
[FreeTextEntry1] : Interval Hx - \par covid s/p paxlovid - hyperglycemia during this time\par also reorts worsening sugar off glipizide, but reports FS 64 this am\par trying to watch diet but gained weight\par \par Quality: Type 2\par Severity: severe, uncontrolled, chronically poor control\par Duration: 2011\par Onset: thirst, increased urination\par \par ASSOCIATED SYMPTOMS/COMPLICATIONS:\par 2022 eye exam (+) retinopathy,  s/p laser tx in past\par (-)neuropathy\par 2022 neg urine microalb/Cr\par \par MODIFYING FACTORS:  trying to watch diet, eats out 1x/week - heavy italian meal\par \par Current DM meds:\par Lantus 30 units at bedtime\par Humalog 18 units before breakfast and 20 units before dinner, does not eat lunch. snack on fruit without insulin\par Trulicity 1.5 mg weekly\par Glucophage 1000 mg BID\par \par SMBG:  meter  reviewd. FS 's occ 200 and 50-60's\par \par \par \par \par

## 2022-08-10 NOTE — ASSESSMENT
[FreeTextEntry1] : 1. T2DM complicated by retinopathy -slight loss of control, stable retinopathy on exam\par - increase trulicity 3 mg weekly\par - cont humalog 18 w BF and 20 with dinner\par - cont lantus 30 units at bedtime\par - cont MFN 1000 mg BID\par - counseled pt on benefits of CGM, but he declined\par - cont SMBG, call with highs/low, discussed decreased lantus dosing if low sugar become more frequent w increased trulicity dosing\par -  yearly eye exam w ophthalmology\par \par 2. HLD - cont statin\par \par 3. HTN - BP elevated, cont carvedilol and enalapril, watch dietary salt intake, strongly advised PCP f/u\par \par 4. Obesity - (+) weight loss,  counseled pt on lifestyle changes, increase trulicity

## 2022-08-10 NOTE — PHYSICAL EXAM
[Alert] : alert [Obese] : obese [No Respiratory Distress] : no respiratory distress [No Accessory Muscle Use] : no accessory muscle use [Normal S1, S2] : normal S1 and S2 [Normal Rate] : heart rate was normal [No Edema] : no peripheral edema [Not Tender] : non-tender [Soft] : abdomen soft [Foot Ulcers] : no foot ulcers [2+] : 2+ in the dorsalis pedis [Vibration Dec.] : normal vibratory sensation at the level of the toes [Diminished Throughout Both Feet] : normal tactile sensation with monofilament testing throughout both feet [Oriented x3] : oriented to person, place, and time [Normal Affect] : the affect was normal [Normal Insight/Judgement] : insight and judgment were intact [Normal Mood] : the mood was normal

## 2022-08-10 NOTE — DATA REVIEWED
[FreeTextEntry1] : Yudelka thakkar in office 9/20/18\par The right thyroid lobe measures 4.0x1.7x1.7 cm. The left thyroid lobe measures 3.8x1.5x1.2 cm. The isthmus measures.5 cm. \par The right thyroid lobe has a homogenous parenchyma. \par The left thyroid lobe has a homogeneous parenchyma . \par There are no distinct nodules visualized. \par ===========================================\par Labs 10/6/18\par Cr 0.93, GFR 86\par urine microalb/Cr 4\par LDl chol 88, Tg 176\par TSh 4.21\par A1c 8.2%\par \par Labs 1/29/19\par Gluc 180, A1c 8%\par Cr 0.86, GFR 91\par LDl 80, HDL 44, Tg 170\par \par Labs 5/16/19\par Gluc 226, A1c 10.1\par Cr 0.89, GFR 90\par urine microalb/Cr 4\par LDL 87, HDL 38, Tg 254\par TSH 3.87\par B12 266\par \par Labs 6/26/20\par Gluc 217, A1c 8.8\par Cr 0.95, GFR 83\par LDL 74, HDL 41, Tg 193\par \par Labs 10/27/20 reviewed\par A1c 7.9, improved but still elevated\par neg urine microalb/Cr\par LDL chol 56\par normal TSH and B12 level\par \par Labs 2/16/21\par urine alb/Cr neg\par LDL 69 - cont statin\par A1c 8.4 - worsening, test pre/post meals and send logs\par normal TSH level\par \par Labs 5/18/21\par Tg 150, LDL 49\par Gluc 98, A1c 8%\par Cr 0.87, GFR 89\par \par Labs 12/27/21\par Tg 203, LDL 61, HDL 38\par Gluc 114, A1c 9.4\par Cr 0.82, GFR 92\par \par Labs 8/9/22\par LDL 51, Tg 163\par Cr 0.88, GFR 94\par A1c 8.1

## 2022-08-15 ENCOUNTER — RX RENEWAL (OUTPATIENT)
Age: 67
End: 2022-08-15

## 2022-09-28 ENCOUNTER — RX RENEWAL (OUTPATIENT)
Age: 67
End: 2022-09-28

## 2022-11-26 ENCOUNTER — APPOINTMENT (OUTPATIENT)
Dept: ENDOCRINOLOGY | Facility: CLINIC | Age: 67
End: 2022-11-26

## 2022-11-26 VITALS
HEIGHT: 67 IN | WEIGHT: 220 LBS | OXYGEN SATURATION: 94 % | SYSTOLIC BLOOD PRESSURE: 125 MMHG | DIASTOLIC BLOOD PRESSURE: 80 MMHG | BODY MASS INDEX: 34.53 KG/M2 | HEART RATE: 84 BPM

## 2022-11-26 LAB — GLUCOSE BLDC GLUCOMTR-MCNC: 125

## 2022-11-26 PROCEDURE — 82962 GLUCOSE BLOOD TEST: CPT

## 2022-11-26 PROCEDURE — 99214 OFFICE O/P EST MOD 30 MIN: CPT | Mod: 25

## 2022-11-26 RX ORDER — GLIPIZIDE 5 MG/1
5 TABLET, FILM COATED, EXTENDED RELEASE ORAL
Qty: 90 | Refills: 2 | Status: DISCONTINUED | COMMUNITY
Start: 2019-11-26 | End: 2022-11-26

## 2022-11-26 NOTE — HISTORY OF PRESENT ILLNESS
[FreeTextEntry1] : Interval Hx - \par doing well, no issues\par \par Quality: Type 2\par Severity: severe, uncontrolled, chronically poor control\par Duration: 2011\par Onset: thirst, increased urination\par \par ASSOCIATED SYMPTOMS/COMPLICATIONS:\par 2022 eye exam (+) retinopathy,  s/p laser tx in past\par (-)neuropathy\par 2022 neg urine microalb/Cr\par \par MODIFYING FACTORS:  trying to watch diet, eats out 1x/week - heavy italian meal \par \par Current DM meds:\par Lantus 30 units at bedtime\par Humalog 18 units before breakfast and 20 units before dinner, does not eat lunch. snack on fruit without insulin; does not take if eating out but takes when he gets when home\par Trulicity 3 mg weekly\par Glucophage 1000 mg BID\par \par SMBG:  logs reviewed. variable numbers ;s, off FS 50-60s\par \par \par \par \par

## 2022-11-26 NOTE — REVIEW OF SYSTEMS
[Recent Weight Loss (___ Lbs)] : recent weight loss: [unfilled] lbs [Blurred Vision] : no blurred vision [Chest Pain] : no chest pain [Shortness Of Breath] : no shortness of breath [SOB on Exertion] : no shortness of breath on exertion [Nausea] : no nausea [Abdominal Pain] : no abdominal pain [Polyuria] : no polyuria [Nocturia] : no nocturia [Pain/Numbness of Digits] : no pain/numbness of digits [Polydipsia] : no polydipsia

## 2022-11-26 NOTE — PHYSICAL EXAM
[Alert] : alert [Obese] : obese [No Respiratory Distress] : no respiratory distress [No Accessory Muscle Use] : no accessory muscle use [Normal S1, S2] : normal S1 and S2 [Normal Rate] : heart rate was normal [No Edema] : no peripheral edema [Not Tender] : non-tender [Soft] : abdomen soft [Oriented x3] : oriented to person, place, and time [Normal Affect] : the affect was normal [Normal Insight/Judgement] : insight and judgment were intact [Normal Mood] : the mood was normal [Foot Ulcers] : no foot ulcers [2+] : 2+ in the dorsalis pedis [Vibration Dec.] : normal vibratory sensation at the level of the toes [Diminished Throughout Both Feet] : normal tactile sensation with monofilament testing throughout both feet

## 2022-11-26 NOTE — ASSESSMENT
[FreeTextEntry1] : 1. T2DM complicated by retinopathy -erratic control, post prandial hypoglycemia (due to insulin timing),  stable retinopathy on exam\par - increase trulicity 4.5 mg weekly\par - cont humalog 18 w BF and 20 with dinner--- do not take after meals which can cause pp hypoglycemia\par - decrease lantus 25 units at bedtime\par - cont MFN 1000 mg BID\par - counseled pt on benefits of CGM, but he declined\par - cont SMBG, call with highs/low\par -  yearly eye exam w ophthalmology\par \par 2. HLD - cont statin\par \par 3. HTN -  cont carvedilol and enalapril\par \par 4. Obesity - (+) weight loss,  counseled pt on lifestyle changes, increase trulicity

## 2022-12-31 ENCOUNTER — RX RENEWAL (OUTPATIENT)
Age: 67
End: 2022-12-31

## 2023-01-18 RX ORDER — PEN NEEDLE, DIABETIC 29 G X1/2"
31G X 5 MM NEEDLE, DISPOSABLE MISCELLANEOUS
Qty: 300 | Refills: 0 | Status: ACTIVE | COMMUNITY
Start: 2018-08-30 | End: 1900-01-01

## 2023-02-19 ENCOUNTER — RX RENEWAL (OUTPATIENT)
Age: 68
End: 2023-02-19

## 2023-03-17 LAB
HBA1C MFR BLD HPLC: 8.2
LDLC SERPL DIRECT ASSAY-MCNC: 49
MICROALBUMIN/CREAT 24H UR-RTO: 4
TSH SERPL-ACNC: 2.79

## 2023-03-18 ENCOUNTER — APPOINTMENT (OUTPATIENT)
Dept: ENDOCRINOLOGY | Facility: CLINIC | Age: 68
End: 2023-03-18
Payer: MEDICARE

## 2023-03-18 VITALS
SYSTOLIC BLOOD PRESSURE: 140 MMHG | BODY MASS INDEX: 34.84 KG/M2 | WEIGHT: 222 LBS | HEIGHT: 67 IN | DIASTOLIC BLOOD PRESSURE: 88 MMHG | HEART RATE: 80 BPM | OXYGEN SATURATION: 97 %

## 2023-03-18 LAB — GLUCOSE BLDC GLUCOMTR-MCNC: 122

## 2023-03-18 PROCEDURE — 82962 GLUCOSE BLOOD TEST: CPT

## 2023-03-18 PROCEDURE — 99214 OFFICE O/P EST MOD 30 MIN: CPT | Mod: 25

## 2023-03-18 RX ORDER — MOXIFLOXACIN OPHTHALMIC 5 MG/ML
0.5 SOLUTION/ DROPS OPHTHALMIC
Qty: 3 | Refills: 0 | Status: DISCONTINUED | COMMUNITY
Start: 2022-10-18 | End: 2023-03-18

## 2023-03-18 RX ORDER — PREDNISOLONE ACETATE 10 MG/ML
1 SUSPENSION/ DROPS OPHTHALMIC
Qty: 10 | Refills: 0 | Status: DISCONTINUED | COMMUNITY
Start: 2022-10-18 | End: 2023-03-18

## 2023-03-18 RX ORDER — BROMFENAC SODIUM 0.7 MG/ML
0.07 SOLUTION/ DROPS OPHTHALMIC
Qty: 3 | Refills: 0 | Status: DISCONTINUED | COMMUNITY
Start: 2022-10-18 | End: 2023-03-18

## 2023-03-18 NOTE — ASSESSMENT
[FreeTextEntry1] : 1. T2DM complicated by retinopathy - worsening control by history\par - cont trulicity 4.5 mg weekly\par - cont humalog 18 w BF and 20 with dinner--- do not take after meals which can cause pp hypoglycemia\par - increase Lantus o 30 units\par - if renal fxn ok, trial of Jardiance 25 mg daily (sample given) but reduce Lantus to 25 unis while on it\par - cont MFN 1000 mg BID\par - declined CGM\par - cont SMBG, call with highs/low\par -  yearly eye exam w ophthalmology\par - monitor B12 level in MFN\par - snack before exercise to prevent lows\par \par 2. HLD - cont statin\par \par 3. HTN -  BP elevated, cont carvedilol and enalapril, Jardiance may help\par \par 4. Obesity - cont Trulciity, Jardiance may help

## 2023-03-18 NOTE — HISTORY OF PRESENT ILLNESS
[FreeTextEntry1] : Interval Hx - \par worsening control with recent URI and lower Lantus dosing\par low sugar on treadmill\par has been trying to watch diet\par \par Quality: Type 2\par Severity: severe, uncontrolled, chronically poor control\par Duration: 2011\par Onset: thirst, increased urination\par \par ASSOCIATED SYMPTOMS/COMPLICATIONS:\par 2022 eye exam (+) retinopathy,  s/p laser tx in past\par (-)neuropathy\par 2022 neg urine microalb/Cr\par \par MODIFYING FACTORS:  trying to watch diet, eats out 1x/week - heavy italian meal \par \par Current DM meds:\par Lantus 25 units at bedtime\par Humalog 18 units before breakfast and 20 units before dinner, does not eat lunch.\par Trulicity 4.5 mg weekly\par Glucophage 1000 mg BID\par \par SMBG: meter reviewed, -280's, occ 50-70's (rare)\par \par \par \par \par

## 2023-04-03 ENCOUNTER — NON-APPOINTMENT (OUTPATIENT)
Age: 68
End: 2023-04-03

## 2023-05-22 ENCOUNTER — RX RENEWAL (OUTPATIENT)
Age: 68
End: 2023-05-22

## 2023-06-06 ENCOUNTER — RX RENEWAL (OUTPATIENT)
Age: 68
End: 2023-06-06

## 2023-07-24 ENCOUNTER — APPOINTMENT (OUTPATIENT)
Dept: ENDOCRINOLOGY | Facility: CLINIC | Age: 68
End: 2023-07-24
Payer: MEDICARE

## 2023-07-24 VITALS
WEIGHT: 214 LBS | HEIGHT: 67 IN | OXYGEN SATURATION: 97 % | SYSTOLIC BLOOD PRESSURE: 128 MMHG | BODY MASS INDEX: 33.59 KG/M2 | HEART RATE: 85 BPM | DIASTOLIC BLOOD PRESSURE: 74 MMHG

## 2023-07-24 LAB
GLUCOSE BLDC GLUCOMTR-MCNC: 110
HBA1C MFR BLD HPLC: 8.3
LDLC SERPL DIRECT ASSAY-MCNC: 53
MICROALBUMIN/CREAT UR-RTO: 7
TSH SERPL-ACNC: 3.16

## 2023-07-24 PROCEDURE — 99214 OFFICE O/P EST MOD 30 MIN: CPT | Mod: 25

## 2023-07-24 PROCEDURE — 82962 GLUCOSE BLOOD TEST: CPT

## 2023-07-24 NOTE — REVIEW OF SYSTEMS
[Recent Weight Loss (___ Lbs)] : recent weight loss: [unfilled] lbs [Fatigue] : no fatigue [Decreased Appetite] : appetite not decreased [Visual Field Defect] : no visual field defect [Blurred Vision] : no blurred vision [Dysphagia] : no dysphagia [Neck Pain] : no neck pain [Dysphonia] : no dysphonia [Chest Pain] : no chest pain [Palpitations] : no palpitations [Constipation] : no constipation [Diarrhea] : no diarrhea [Polyuria] : no polyuria [Dysuria] : no dysuria [Headaches] : no headaches [Tremors] : no tremors [Depression] : no depression [Anxiety] : no anxiety [Polydipsia] : no polydipsia [de-identified] : position vertigo

## 2023-07-24 NOTE — ASSESSMENT
[FreeTextEntry1] : 1. T2DM complicated by retinopathy - stable control by history and lost 6 pounds, needs updated labs- patient going for labs tomorrow\par - cont trulicity 4.5 mg weekly\par - cont humalog 18 with BF and 20 with dinner\par - continue Lantus and Jardiance \par - cont MFN 1000 mg BID\par - declined CGM\par - cont SMBG, call with highs/low\par -  yearly eye exam w ophthalmology\par - monitor B12 level in MFN\par - snack before exercise to prevent lows\par \par 2. HLD - cont statin, needs updated labs\par \par 3. HTN -  BP acceptable, cont carvedilol and enalapril, Jardiance may help\par \par 4. Obesity - cont Trulciity and Jardiance may help\par \par RTO in 3 months with Dr. Masterson

## 2023-07-24 NOTE — HISTORY OF PRESENT ILLNESS
[FreeTextEntry1] : Interval Hx - \par walking outside daily and uses treadmill with bad weather \par has been eating out a lot \par \par Quality: Type 2\par Severity: severe, uncontrolled, chronically poor control\par Duration: 2011\par Onset: thirst, increased urination\par \par ASSOCIATED SYMPTOMS/COMPLICATIONS:\par 2022 eye exam (+) retinopathy,  s/p laser tx in past\par (-)neuropathy\par 2022 neg urine microalb/Cr\par \par MODIFYING FACTORS:  trying to watch diet, eats out 1x/week - heavy italian meal \par \par Current DM meds:\par Lantus 25 units at bedtime\par Humalog 18 units before breakfast and 20 units before dinner, does not eat lunch.\par Trulicity 4.5 mg weekly\par Glucophage 1000 mg BID\par \par SMBG: meter reviewed, -240s, occ 80s (rare)\par 14 day blood sugar average 145\par Current \par \par

## 2023-07-29 ENCOUNTER — APPOINTMENT (OUTPATIENT)
Dept: ENDOCRINOLOGY | Facility: CLINIC | Age: 68
End: 2023-07-29

## 2023-08-21 ENCOUNTER — RX RENEWAL (OUTPATIENT)
Age: 68
End: 2023-08-21

## 2023-10-31 LAB
HBA1C MFR BLD HPLC: 7.8
LDLC SERPL DIRECT ASSAY-MCNC: 66
MICROALBUMIN/CREAT 24H UR-RTO: 5
TSH SERPL-ACNC: 4.6

## 2023-11-01 ENCOUNTER — APPOINTMENT (OUTPATIENT)
Dept: ENDOCRINOLOGY | Facility: CLINIC | Age: 68
End: 2023-11-01

## 2023-11-01 ENCOUNTER — APPOINTMENT (OUTPATIENT)
Dept: ENDOCRINOLOGY | Facility: CLINIC | Age: 68
End: 2023-11-01
Payer: MEDICARE

## 2023-11-01 VITALS
WEIGHT: 214 LBS | DIASTOLIC BLOOD PRESSURE: 88 MMHG | BODY MASS INDEX: 33.59 KG/M2 | HEART RATE: 88 BPM | SYSTOLIC BLOOD PRESSURE: 148 MMHG | OXYGEN SATURATION: 95 % | HEIGHT: 67 IN

## 2023-11-01 DIAGNOSIS — Z79.4 LONG TERM (CURRENT) USE OF INSULIN: ICD-10-CM

## 2023-11-01 LAB — GLUCOSE BLDC GLUCOMTR-MCNC: 154

## 2023-11-01 PROCEDURE — 99215 OFFICE O/P EST HI 40 MIN: CPT | Mod: 25

## 2023-11-01 PROCEDURE — 82962 GLUCOSE BLOOD TEST: CPT

## 2023-11-21 ENCOUNTER — RX RENEWAL (OUTPATIENT)
Age: 68
End: 2023-11-21

## 2024-01-31 ENCOUNTER — RX RENEWAL (OUTPATIENT)
Age: 69
End: 2024-01-31

## 2024-01-31 RX ORDER — METFORMIN HYDROCHLORIDE 1000 MG/1
1000 TABLET, COATED ORAL
Qty: 180 | Refills: 1 | Status: ACTIVE | COMMUNITY
Start: 2020-02-20 | End: 1900-01-01

## 2024-02-05 LAB
HBA1C MFR BLD HPLC: 7.9
LDLC SERPL DIRECT ASSAY-MCNC: 64

## 2024-02-07 ENCOUNTER — APPOINTMENT (OUTPATIENT)
Dept: ENDOCRINOLOGY | Facility: CLINIC | Age: 69
End: 2024-02-07
Payer: MEDICARE

## 2024-02-07 VITALS
WEIGHT: 215 LBS | SYSTOLIC BLOOD PRESSURE: 142 MMHG | HEIGHT: 67 IN | DIASTOLIC BLOOD PRESSURE: 90 MMHG | HEART RATE: 83 BPM | OXYGEN SATURATION: 94 % | BODY MASS INDEX: 33.74 KG/M2

## 2024-02-07 DIAGNOSIS — I10 ESSENTIAL (PRIMARY) HYPERTENSION: ICD-10-CM

## 2024-02-07 LAB — GLUCOSE BLDC GLUCOMTR-MCNC: 126

## 2024-02-07 PROCEDURE — 99214 OFFICE O/P EST MOD 30 MIN: CPT

## 2024-02-07 PROCEDURE — G0447 BEHAVIOR COUNSEL OBESITY 15M: CPT | Mod: 59

## 2024-02-07 PROCEDURE — 82962 GLUCOSE BLOOD TEST: CPT

## 2024-02-07 NOTE — ASSESSMENT
[FreeTextEntry1] : 69 y,o. Male with PMHx of DM, HTN, HLD presents for follow up. Former patient of Dr. Masterson. Dx with DM at age of 44. Dx with DM in 2011. Retired .    Currently on: Lantus 25U qhs Humalog 18 - 20U with breakfast and dinner (breakfast is at 11:30AM, Dinner betw 9PM and MN) Metformin 1000 mg BID Trulicity 4.5 mg weekly Jardiance 25 mg daily  # Moderately controlled DM A1C 7.9% Needs dietary reorganization of his meals. Advised to have designated breakfast lunch and dinner. Move dinner to earlier 6-7PM. Ideally take walk after dinner Avoid large late meals. He is dining out every weekend with family Continue the rest of medical regimen  # Obesity Discussed dietary and lifestyle modifications Encouraged walking for 30 min at least 3 x week  # HLD Continue Rosuvastatin Low fat/cholesterol diet  F/u in 3 months.

## 2024-02-07 NOTE — PHYSICAL EXAM
[Alert] : alert [Obese] : obese [No Acute Distress] : no acute distress [Well Developed] : well developed [Normal Voice/Communication] : normal voice communication [PERRL] : pupils equal, round and reactive to light [Normal Hearing] : hearing was normal [No Neck Mass] : no neck mass was observed [No Respiratory Distress] : no respiratory distress [Clear to Auscultation] : lungs were clear to auscultation bilaterally [Normal Rate] : heart rate was normal [Regular Rhythm] : with a regular rhythm [No Edema] : no peripheral edema [Soft] : abdomen soft [No Clubbing, Cyanosis] : no clubbing  or cyanosis of the fingernails [No Tremors] : no tremors [Oriented x3] : oriented to person, place, and time [Normal Affect] : the affect was normal [Normal Insight/Judgement] : insight and judgment were intact [Normal Mood] : the mood was normal [de-identified] : Obese

## 2024-02-07 NOTE — HISTORY OF PRESENT ILLNESS
[FreeTextEntry1] : 69 y,o. Male with PMHx of DM, HTN, HLD presents for follow up. Former patient of Dr. Masterson. Dx with DM in 2011. Retired .

## 2024-02-16 RX ORDER — INSULIN LISPRO 100 [IU]/ML
100 INJECTION, SOLUTION INTRAVENOUS; SUBCUTANEOUS
Qty: 3 | Refills: 1 | Status: ACTIVE | COMMUNITY
Start: 2019-03-04 | End: 1900-01-01

## 2024-03-01 ENCOUNTER — RX RENEWAL (OUTPATIENT)
Age: 69
End: 2024-03-01

## 2024-03-01 RX ORDER — BLOOD SUGAR DIAGNOSTIC
STRIP MISCELLANEOUS
Qty: 300 | Refills: 3 | Status: ACTIVE | COMMUNITY
Start: 2020-05-12 | End: 1900-01-01

## 2024-03-29 RX ORDER — INSULIN GLARGINE 100 [IU]/ML
100 INJECTION, SOLUTION SUBCUTANEOUS
Qty: 2 | Refills: 1 | Status: ACTIVE | COMMUNITY
Start: 2019-06-17 | End: 1900-01-01

## 2024-04-04 ENCOUNTER — RX RENEWAL (OUTPATIENT)
Age: 69
End: 2024-04-04

## 2024-04-04 RX ORDER — EMPAGLIFLOZIN 25 MG/1
25 TABLET, FILM COATED ORAL
Qty: 90 | Refills: 1 | Status: ACTIVE | COMMUNITY
Start: 2023-04-03 | End: 1900-01-01

## 2024-05-28 LAB
HBA1C MFR BLD HPLC: 7.8
LDLC SERPL DIRECT ASSAY-MCNC: 54
MICROALBUMIN/CREAT 24H UR-RTO: 4
TSH SERPL-ACNC: 3.93

## 2024-05-29 ENCOUNTER — APPOINTMENT (OUTPATIENT)
Dept: ENDOCRINOLOGY | Facility: CLINIC | Age: 69
End: 2024-05-29
Payer: MEDICARE

## 2024-05-29 VITALS
BODY MASS INDEX: 32.96 KG/M2 | OXYGEN SATURATION: 97 % | HEART RATE: 76 BPM | DIASTOLIC BLOOD PRESSURE: 72 MMHG | WEIGHT: 210 LBS | HEIGHT: 67 IN | SYSTOLIC BLOOD PRESSURE: 130 MMHG

## 2024-05-29 DIAGNOSIS — E11.65 TYPE 2 DIABETES MELLITUS WITH HYPERGLYCEMIA: ICD-10-CM

## 2024-05-29 DIAGNOSIS — E66.9 OBESITY, UNSPECIFIED: ICD-10-CM

## 2024-05-29 DIAGNOSIS — E78.5 HYPERLIPIDEMIA, UNSPECIFIED: ICD-10-CM

## 2024-05-29 LAB
GLUCOSE BLDC GLUCOMTR-MCNC: 166
HBA1C MFR BLD HPLC: 7.9
LDLC SERPL DIRECT ASSAY-MCNC: 53
MICROALBUMIN/CREAT 24H UR-RTO: 10

## 2024-05-29 PROCEDURE — 99214 OFFICE O/P EST MOD 30 MIN: CPT

## 2024-05-29 PROCEDURE — 82962 GLUCOSE BLOOD TEST: CPT

## 2024-05-29 PROCEDURE — G2211 COMPLEX E/M VISIT ADD ON: CPT

## 2024-05-29 PROCEDURE — G0447 BEHAVIOR COUNSEL OBESITY 15M: CPT | Mod: 59

## 2024-05-29 NOTE — PHYSICAL EXAM
[Alert] : alert [Obese] : obese [No Acute Distress] : no acute distress [Well Developed] : well developed [Normal Voice/Communication] : normal voice communication [PERRL] : pupils equal, round and reactive to light [Normal Hearing] : hearing was normal [No Neck Mass] : no neck mass was observed [No Respiratory Distress] : no respiratory distress [Clear to Auscultation] : lungs were clear to auscultation bilaterally [Normal Rate] : heart rate was normal [Regular Rhythm] : with a regular rhythm [No Edema] : no peripheral edema [Soft] : abdomen soft [No Clubbing, Cyanosis] : no clubbing  or cyanosis of the fingernails [No Tremors] : no tremors [Oriented x3] : oriented to person, place, and time [Normal Affect] : the affect was normal [Normal Insight/Judgement] : insight and judgment were intact [Normal Mood] : the mood was normal [de-identified] : Obese

## 2024-05-29 NOTE — ASSESSMENT
[FreeTextEntry1] : 69 y,o. Male with PMHx of DM, HTN, HLD presents for follow up. Former patient of Dr. Masterson. Dx with DM at age of 44. Dx with DM in 2011. Retired .    Currently on: Lantus 25U qhs Humalog 18 - 20U with breakfast and dinner (breakfast is at 11:30AM, Dinner betw 9PM and MN) Metformin 1000 mg BID Trulicity 4.5 mg weekly Jardiance 25 mg daily  SMBG: glucometer reviewed. Fasting can vary 114 - 138 - 177.  After dinner and at bedtime also vary 140 - 170  # Moderately controlled DM A1C 7.9%<==7.8% Needs dietary reorganization of his meals. Advised to have designated breakfast lunch and dinner.  Discussed again to move dinner earlier 6-7PM. Ideally take walk after dinner Avoid large late meals. He is dining out every weekend with family Continue the rest of medical regimen  # Obesity Discussed dietary and lifestyle modifications Encouraged walking for 30 min at least 3 x week  # HLD Continue Rosuvastatin Low fat/cholesterol diet  F/u in 3 months with NP.  F/u with me in 6 months.

## 2024-05-31 RX ORDER — DULAGLUTIDE 4.5 MG/.5ML
4.5 INJECTION, SOLUTION SUBCUTANEOUS
Qty: 6 | Refills: 0 | Status: ACTIVE | COMMUNITY
Start: 2021-05-11 | End: 1900-01-01

## 2024-08-08 ENCOUNTER — RX RENEWAL (OUTPATIENT)
Age: 69
End: 2024-08-08

## 2024-08-19 ENCOUNTER — RESULT CHARGE (OUTPATIENT)
Age: 69
End: 2024-08-19

## 2024-08-19 ENCOUNTER — APPOINTMENT (OUTPATIENT)
Dept: ENDOCRINOLOGY | Facility: CLINIC | Age: 69
End: 2024-08-19
Payer: MEDICARE

## 2024-08-19 VITALS
WEIGHT: 217 LBS | BODY MASS INDEX: 34.06 KG/M2 | DIASTOLIC BLOOD PRESSURE: 80 MMHG | HEART RATE: 86 BPM | SYSTOLIC BLOOD PRESSURE: 126 MMHG | OXYGEN SATURATION: 93 % | HEIGHT: 67 IN

## 2024-08-19 DIAGNOSIS — E11.65 TYPE 2 DIABETES MELLITUS WITH HYPERGLYCEMIA: ICD-10-CM

## 2024-08-19 DIAGNOSIS — E66.9 OBESITY, UNSPECIFIED: ICD-10-CM

## 2024-08-19 DIAGNOSIS — E78.5 HYPERLIPIDEMIA, UNSPECIFIED: ICD-10-CM

## 2024-08-19 LAB
GLUCOSE BLDC GLUCOMTR-MCNC: 111
HBA1C MFR BLD HPLC: 8.2

## 2024-08-19 PROCEDURE — 83036 HEMOGLOBIN GLYCOSYLATED A1C: CPT | Mod: QW

## 2024-08-19 PROCEDURE — 82962 GLUCOSE BLOOD TEST: CPT

## 2024-08-19 PROCEDURE — 99214 OFFICE O/P EST MOD 30 MIN: CPT

## 2024-10-11 RX ORDER — DULAGLUTIDE 4.5 MG/.5ML
4.5 INJECTION, SOLUTION SUBCUTANEOUS
Qty: 3 | Refills: 0 | Status: ACTIVE | COMMUNITY
Start: 2024-10-11 | End: 1900-01-01

## 2025-01-21 ENCOUNTER — NON-APPOINTMENT (OUTPATIENT)
Age: 70
End: 2025-01-21

## 2025-01-21 ENCOUNTER — APPOINTMENT (OUTPATIENT)
Dept: ENDOCRINOLOGY | Facility: CLINIC | Age: 70
End: 2025-01-21
Payer: MEDICARE

## 2025-01-21 VITALS
OXYGEN SATURATION: 96 % | HEART RATE: 84 BPM | HEIGHT: 67 IN | BODY MASS INDEX: 33.59 KG/M2 | DIASTOLIC BLOOD PRESSURE: 80 MMHG | WEIGHT: 214 LBS | SYSTOLIC BLOOD PRESSURE: 130 MMHG

## 2025-01-21 DIAGNOSIS — E78.5 HYPERLIPIDEMIA, UNSPECIFIED: ICD-10-CM

## 2025-01-21 DIAGNOSIS — E11.65 TYPE 2 DIABETES MELLITUS WITH HYPERGLYCEMIA: ICD-10-CM

## 2025-01-21 LAB — GLUCOSE BLDC GLUCOMTR-MCNC: 176

## 2025-01-21 PROCEDURE — 99214 OFFICE O/P EST MOD 30 MIN: CPT | Mod: 25

## 2025-01-21 PROCEDURE — G2211 COMPLEX E/M VISIT ADD ON: CPT

## 2025-01-21 PROCEDURE — G0447 BEHAVIOR COUNSEL OBESITY 15M: CPT | Mod: 59

## 2025-01-21 PROCEDURE — 82962 GLUCOSE BLOOD TEST: CPT

## 2025-02-11 ENCOUNTER — RX RENEWAL (OUTPATIENT)
Age: 70
End: 2025-02-11

## 2025-04-02 ENCOUNTER — RX RENEWAL (OUTPATIENT)
Age: 70
End: 2025-04-02

## 2025-04-22 ENCOUNTER — RESULT CHARGE (OUTPATIENT)
Age: 70
End: 2025-04-22

## 2025-04-22 ENCOUNTER — APPOINTMENT (OUTPATIENT)
Dept: ENDOCRINOLOGY | Facility: CLINIC | Age: 70
End: 2025-04-22
Payer: MEDICARE

## 2025-04-22 VITALS
BODY MASS INDEX: 33.27 KG/M2 | SYSTOLIC BLOOD PRESSURE: 120 MMHG | HEIGHT: 67 IN | OXYGEN SATURATION: 95 % | DIASTOLIC BLOOD PRESSURE: 80 MMHG | WEIGHT: 212 LBS | HEART RATE: 84 BPM

## 2025-04-22 DIAGNOSIS — E78.5 HYPERLIPIDEMIA, UNSPECIFIED: ICD-10-CM

## 2025-04-22 DIAGNOSIS — E11.65 TYPE 2 DIABETES MELLITUS WITH HYPERGLYCEMIA: ICD-10-CM

## 2025-04-22 DIAGNOSIS — E66.9 OBESITY, UNSPECIFIED: ICD-10-CM

## 2025-04-22 LAB — GLUCOSE BLDC GLUCOMTR-MCNC: 185

## 2025-04-22 PROCEDURE — 82962 GLUCOSE BLOOD TEST: CPT

## 2025-04-22 PROCEDURE — 99214 OFFICE O/P EST MOD 30 MIN: CPT

## 2025-07-09 ENCOUNTER — RX RENEWAL (OUTPATIENT)
Age: 70
End: 2025-07-09

## 2025-07-30 ENCOUNTER — RX RENEWAL (OUTPATIENT)
Age: 70
End: 2025-07-30

## 2025-08-04 ENCOUNTER — RX RENEWAL (OUTPATIENT)
Age: 70
End: 2025-08-04

## 2025-09-16 LAB
HBA1C MFR BLD HPLC: 8.4
LDLC SERPL DIRECT ASSAY-MCNC: 49
MICROALBUMIN/CREAT 24H UR-RTO: NORMAL
TSH SERPL-ACNC: 3.33

## 2025-09-17 ENCOUNTER — APPOINTMENT (OUTPATIENT)
Dept: ENDOCRINOLOGY | Facility: CLINIC | Age: 70
End: 2025-09-17
Payer: MEDICARE

## 2025-09-17 VITALS
OXYGEN SATURATION: 95 % | WEIGHT: 216 LBS | SYSTOLIC BLOOD PRESSURE: 125 MMHG | DIASTOLIC BLOOD PRESSURE: 80 MMHG | BODY MASS INDEX: 33.9 KG/M2 | HEIGHT: 67 IN | HEART RATE: 77 BPM

## 2025-09-17 DIAGNOSIS — E78.5 HYPERLIPIDEMIA, UNSPECIFIED: ICD-10-CM

## 2025-09-17 DIAGNOSIS — E11.65 TYPE 2 DIABETES MELLITUS WITH HYPERGLYCEMIA: ICD-10-CM

## 2025-09-17 LAB — GLUCOSE BLDC GLUCOMTR-MCNC: 130

## 2025-09-17 PROCEDURE — G2211 COMPLEX E/M VISIT ADD ON: CPT

## 2025-09-17 PROCEDURE — G0447 BEHAVIOR COUNSEL OBESITY 15M: CPT | Mod: 59

## 2025-09-17 PROCEDURE — 82962 GLUCOSE BLOOD TEST: CPT

## 2025-09-17 PROCEDURE — 99214 OFFICE O/P EST MOD 30 MIN: CPT
